# Patient Record
Sex: MALE | Race: WHITE
[De-identification: names, ages, dates, MRNs, and addresses within clinical notes are randomized per-mention and may not be internally consistent; named-entity substitution may affect disease eponyms.]

---

## 2018-01-03 ENCOUNTER — HOSPITAL ENCOUNTER (EMERGENCY)
Dept: HOSPITAL 96 - M.ERS | Age: 58
Discharge: HOME | End: 2018-01-03
Payer: COMMERCIAL

## 2018-01-03 VITALS — WEIGHT: 125.99 LBS | BODY MASS INDEX: 19.78 KG/M2 | HEIGHT: 67 IN

## 2018-01-03 VITALS — DIASTOLIC BLOOD PRESSURE: 65 MMHG | SYSTOLIC BLOOD PRESSURE: 131 MMHG

## 2018-01-03 DIAGNOSIS — Z88.0: ICD-10-CM

## 2018-01-03 DIAGNOSIS — Z91.030: ICD-10-CM

## 2018-01-03 DIAGNOSIS — J43.9: ICD-10-CM

## 2018-01-03 DIAGNOSIS — E03.9: ICD-10-CM

## 2018-01-03 DIAGNOSIS — Z98.890: ICD-10-CM

## 2018-01-03 DIAGNOSIS — J44.1: Primary | ICD-10-CM

## 2018-01-03 DIAGNOSIS — F10.99: ICD-10-CM

## 2018-01-03 DIAGNOSIS — Z88.8: ICD-10-CM

## 2018-01-03 DIAGNOSIS — Z88.1: ICD-10-CM

## 2018-01-03 DIAGNOSIS — J20.9: ICD-10-CM

## 2018-01-03 DIAGNOSIS — Z85.89: ICD-10-CM

## 2018-03-25 ENCOUNTER — HOSPITAL ENCOUNTER (EMERGENCY)
Dept: HOSPITAL 96 - M.ERS | Age: 58
LOS: 1 days | Discharge: HOME | End: 2018-03-26
Payer: COMMERCIAL

## 2018-03-25 VITALS — WEIGHT: 127.01 LBS | BODY MASS INDEX: 19.93 KG/M2 | HEIGHT: 67 IN

## 2018-03-25 DIAGNOSIS — E03.9: ICD-10-CM

## 2018-03-25 DIAGNOSIS — J44.1: Primary | ICD-10-CM

## 2018-03-25 LAB
ABSOLUTE BASOPHILS: 0.1 THOU/UL (ref 0–0.2)
ABSOLUTE EOSINOPHILS: 1 THOU/UL (ref 0–0.7)
ABSOLUTE MONOCYTES: 0.8 THOU/UL (ref 0–1.2)
ALBUMIN SERPL-MCNC: 3.5 G/DL (ref 3.4–5)
ALP SERPL-CCNC: 64 U/L (ref 46–116)
ALT SERPL-CCNC: 32 U/L (ref 30–65)
ANION GAP SERPL CALC-SCNC: 12 MMOL/L (ref 7–16)
AST SERPL-CCNC: 26 U/L (ref 15–37)
BASOPHILS NFR BLD AUTO: 1 %
BILIRUB SERPL-MCNC: 0.4 MG/DL
BUN SERPL-MCNC: 8 MG/DL (ref 7–18)
CALCIUM SERPL-MCNC: 9.1 MG/DL (ref 8.5–10.1)
CHLORIDE SERPL-SCNC: 103 MMOL/L (ref 98–107)
CO2 SERPL-SCNC: 25 MMOL/L (ref 21–32)
CREAT SERPL-MCNC: 0.7 MG/DL (ref 0.6–1.3)
EOSINOPHIL NFR BLD: 11 %
GLUCOSE SERPL-MCNC: 80 MG/DL (ref 70–99)
GRANULOCYTES NFR BLD MANUAL: 61 %
HCT VFR BLD CALC: 38.4 % (ref 42–52)
HGB BLD-MCNC: 12.7 GM/DL (ref 14–18)
LIPASE: 132 U/L (ref 73–393)
LYMPHOCYTES # BLD: 1.7 THOU/UL (ref 0.8–5.3)
LYMPHOCYTES NFR BLD AUTO: 17 %
MAGNESIUM SERPL-MCNC: 2 MG/DL (ref 1.8–2.4)
MCH RBC QN AUTO: 29.7 PG (ref 26–34)
MCHC RBC AUTO-ENTMCNC: 33 G/DL (ref 28–37)
MCV RBC: 89.9 FL (ref 80–100)
MONOCYTES NFR BLD: 9 %
MPV: 8.7 FL. (ref 7.2–11.1)
NEUTROPHILS # BLD: 5.7 THOU/UL (ref 1.6–8.1)
NUCLEATED RBCS: 0 /100WBC
PLATELET # BLD EST: ADEQUATE 10*3/UL
PLATELET CLUMP BLD QL SMEAR: (no result)
PLATELET COUNT*: 280 THOU/UL (ref 150–400)
POTASSIUM SERPL-SCNC: 3.8 MMOL/L (ref 3.5–5.1)
PROT SERPL-MCNC: 6.9 G/DL (ref 6.4–8.2)
RBC # BLD AUTO: 4.27 MIL/UL (ref 4.5–6)
RBC MORPH BLD: NORMAL
RDW-CV: 15.3 % (ref 10.5–14.5)
SODIUM SERPL-SCNC: 140 MMOL/L (ref 136–145)
TROPONIN-I LEVEL: <0.06 NG/ML (ref ?–0.06)
VARIANT LYMPHS NFR BLD MANUAL: 1 %
WBC # BLD AUTO: 9.3 THOU/UL (ref 4–11)

## 2018-03-26 VITALS — SYSTOLIC BLOOD PRESSURE: 130 MMHG | DIASTOLIC BLOOD PRESSURE: 59 MMHG

## 2018-03-26 NOTE — EKG
[place] [address]
Phone:  [phone]                     ELECTROCARDIOGRAM REPORT      
_______________________________________________________________________________
 
Name:       COLIN MCDERMOTT             [facility]                      [id number]      Account #:      [id number]  
Admission:  18     Attend Phys:                         
Discharge:  18     Date of Birth:  [birth year]  
         Report #: [id number]
    [id number]
_______________________________________________________________________________
THIS REPORT FOR:  //name//                      
 
                         [facility] ED
                                       
Test Date:    2018               Test Time:    22:34:06
Pat Name:     COLIN MCDERMOTT           Department:   
Patient ID:   [id number]            Room:          
Gender:       M                        Technician:   CITLALLI
:          1960               Requested By: Ramos Angela
Order Number: [id number] MD:   Rj Allen
                                 Measurements
Intervals                              Axis          
Rate:         66                       P:            73
AR:           148                      QRS:          34
QRSD:         103                      T:            41
QT:           411                                    
QTc:          431                                    
                           Interpretive Statements
Sinus rhythm
RSR' in V1 or V2, right VCD 
Electronically Signed On 3- 14:50:07 CDT by Rj Allen
[email or web address]
 
 
 
 
 
 
 
 
 
 
 
 
 
 
 
 
 
 
 
 
 
  <ELECTRONICALLY SIGNED>
                                           By: Rj Allen MD, [facility]     
  18     1450
D: 18 2234   _____________________________________
T: 18 2234   Rj Allen MD, FACC       /EPI

## 2019-03-16 ENCOUNTER — HOSPITAL ENCOUNTER (EMERGENCY)
Dept: HOSPITAL 96 - M.ERS | Age: 59
Discharge: HOME | End: 2019-03-16
Payer: COMMERCIAL

## 2019-03-16 VITALS — HEIGHT: 67 IN | BODY MASS INDEX: 18.99 KG/M2 | WEIGHT: 121.01 LBS

## 2019-03-16 VITALS — DIASTOLIC BLOOD PRESSURE: 60 MMHG | SYSTOLIC BLOOD PRESSURE: 122 MMHG

## 2019-03-16 DIAGNOSIS — M54.2: ICD-10-CM
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
## 2019-03-16 DIAGNOSIS — E03.9: ICD-10-CM
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
## 2019-03-16 DIAGNOSIS — Z88.0: ICD-10-CM

## 2019-03-16 DIAGNOSIS — J44.9: ICD-10-CM

## 2019-03-16 DIAGNOSIS — Z85.89: ICD-10-CM

## 2019-03-16 DIAGNOSIS — Z88.1: ICD-10-CM
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
## 2019-03-16 DIAGNOSIS — Z88.8: ICD-10-CM

## 2019-03-16 DIAGNOSIS — S62.630A: Primary | ICD-10-CM

## 2019-04-06 ENCOUNTER — HOSPITAL ENCOUNTER (EMERGENCY)
Dept: HOSPITAL 96 - M.ERS | Age: 59
Discharge: HOME | End: 2019-04-06
Payer: COMMERCIAL

## 2019-04-06 VITALS — DIASTOLIC BLOOD PRESSURE: 53 MMHG | SYSTOLIC BLOOD PRESSURE: 109 MMHG

## 2019-04-06 VITALS — BODY MASS INDEX: 19.15 KG/M2 | WEIGHT: 122 LBS | HEIGHT: 67 IN

## 2019-04-06 DIAGNOSIS — Z88.8: ICD-10-CM

## 2019-04-06 DIAGNOSIS — E03.9: ICD-10-CM

## 2019-04-06 DIAGNOSIS — Z85.818: ICD-10-CM

## 2019-04-06 DIAGNOSIS — J44.9: ICD-10-CM

## 2019-04-06 DIAGNOSIS — Z91.030: ICD-10-CM

## 2019-04-06 DIAGNOSIS — M54.5: ICD-10-CM

## 2019-04-06 DIAGNOSIS — G89.29: ICD-10-CM

## 2019-04-06 DIAGNOSIS — H66.92: Primary | ICD-10-CM

## 2019-04-06 DIAGNOSIS — Z88.1: ICD-10-CM

## 2019-04-06 DIAGNOSIS — Z88.0: ICD-10-CM

## 2019-05-21 ENCOUNTER — HOSPITAL ENCOUNTER (EMERGENCY)
Dept: HOSPITAL 96 - M.ERS | Age: 59
Discharge: HOME | End: 2019-05-21
Payer: COMMERCIAL

## 2019-05-21 VITALS — HEIGHT: 67 IN | BODY MASS INDEX: 18.68 KG/M2 | WEIGHT: 119.01 LBS

## 2019-05-21 VITALS — SYSTOLIC BLOOD PRESSURE: 113 MMHG | DIASTOLIC BLOOD PRESSURE: 70 MMHG

## 2019-05-21 DIAGNOSIS — Z88.6: ICD-10-CM

## 2019-05-21 DIAGNOSIS — Z88.0: ICD-10-CM

## 2019-05-21 DIAGNOSIS — M54.5: ICD-10-CM

## 2019-05-21 DIAGNOSIS — G89.29: ICD-10-CM

## 2019-05-21 DIAGNOSIS — J44.1: Primary | ICD-10-CM

## 2019-05-21 DIAGNOSIS — E03.9: ICD-10-CM

## 2019-05-21 DIAGNOSIS — Z85.818: ICD-10-CM

## 2019-05-21 DIAGNOSIS — Z88.8: ICD-10-CM

## 2019-11-10 ENCOUNTER — HOSPITAL ENCOUNTER (EMERGENCY)
Dept: HOSPITAL 96 - M.ERS | Age: 59
Discharge: HOME | End: 2019-11-10
Payer: COMMERCIAL

## 2019-11-10 VITALS — DIASTOLIC BLOOD PRESSURE: 84 MMHG | SYSTOLIC BLOOD PRESSURE: 136 MMHG

## 2019-11-10 VITALS — WEIGHT: 139.99 LBS | HEIGHT: 67 IN | BODY MASS INDEX: 21.97 KG/M2

## 2019-11-10 DIAGNOSIS — E03.9: ICD-10-CM

## 2019-11-10 DIAGNOSIS — Z85.89: ICD-10-CM

## 2019-11-10 DIAGNOSIS — Z88.6: ICD-10-CM

## 2019-11-10 DIAGNOSIS — Z88.0: ICD-10-CM

## 2019-11-10 DIAGNOSIS — Z88.8: ICD-10-CM

## 2019-11-10 DIAGNOSIS — Z88.1: ICD-10-CM

## 2019-11-10 DIAGNOSIS — M25.541: ICD-10-CM

## 2019-11-10 DIAGNOSIS — J44.9: ICD-10-CM

## 2019-11-10 DIAGNOSIS — M79.89: ICD-10-CM

## 2019-11-10 DIAGNOSIS — M25.542: Primary | ICD-10-CM

## 2020-08-09 ENCOUNTER — HOSPITAL ENCOUNTER (EMERGENCY)
Dept: HOSPITAL 96 - M.ERS | Age: 60
LOS: 1 days | Discharge: HOME | End: 2020-08-10
Payer: COMMERCIAL

## 2020-08-09 VITALS — WEIGHT: 117 LBS | HEIGHT: 67 IN | BODY MASS INDEX: 18.36 KG/M2

## 2020-08-09 DIAGNOSIS — Z88.1: ICD-10-CM

## 2020-08-09 DIAGNOSIS — Z88.0: ICD-10-CM

## 2020-08-09 DIAGNOSIS — Z85.89: ICD-10-CM

## 2020-08-09 DIAGNOSIS — J44.1: Primary | ICD-10-CM

## 2020-08-10 VITALS — SYSTOLIC BLOOD PRESSURE: 154 MMHG | DIASTOLIC BLOOD PRESSURE: 73 MMHG

## 2020-08-10 NOTE — EKG
[place] [address]
Phone:  [phone]                     ELECTROCARDIOGRAM REPORT      
_______________________________________________________________________________
 
Name:         COLIN MCDERMOTT            Room:                     [facility]#:    [id number]     Account #:     [id number]  
Admission:    20    Attend Phys:                     
Discharge:    08/10/20    Date of Birth: [birth year]  
Date of Service: 20 2349  Report #:      [id number]
        [id number]
_______________________________________________________________________________
THIS REPORT FOR:  //name//                      
 
                         [facility] ED
                                       
Test Date:    2020               Test Time:    23:49:13
Pat Name:     COLIN MCDERMOTT           Department:   
Patient ID:   [id number]            Room:          
Gender:                               Technician:   RUTH
:          1960               Requested By: Kal Muñiz
Order Number: [id number]    Sisi MD:   Jacob Cid
                                 Measurements
Intervals                              Axis          
Rate:         100                      P:            78
MS:           165                      QRS:          29
QRSD:         107                      T:            64
QT:           353                                    
QTc:          456                                    
                           Interpretive Statements
Sinus tachycardia
RSR' in V1 or V2, right VCD or RVH
No previous ECG available for comparison
Electronically Signed On 8- 16:54:16 CDT by Jacob Cid
[email or web address]
 
 
 
 
 
 
 
 
 
 
 
 
 
 
 
 
 
 
 
 
 
  <ELECTRONICALLY SIGNED>
                                           By: Jacob Cid MD, [facility]      
  08/10/20     1654
D: 20 2349   _____________________________________
T: 20 2349   Jacob Cid MD, [facility]        /EPI